# Patient Record
Sex: MALE | Race: WHITE
[De-identification: names, ages, dates, MRNs, and addresses within clinical notes are randomized per-mention and may not be internally consistent; named-entity substitution may affect disease eponyms.]

---

## 2020-08-05 ENCOUNTER — HOSPITAL ENCOUNTER (INPATIENT)
Dept: HOSPITAL 72 - EMR | Age: 55
LOS: 3 days | Discharge: NURSING, REHAB OR LONG-TERM CARE | DRG: 71 | End: 2020-08-08
Payer: MEDICARE

## 2020-08-05 VITALS
DIASTOLIC BLOOD PRESSURE: 90 MMHG | BODY MASS INDEX: 37.93 KG/M2 | SYSTOLIC BLOOD PRESSURE: 132 MMHG | HEIGHT: 72 IN | WEIGHT: 280 LBS

## 2020-08-05 VITALS — DIASTOLIC BLOOD PRESSURE: 73 MMHG | SYSTOLIC BLOOD PRESSURE: 127 MMHG

## 2020-08-05 DIAGNOSIS — Z88.0: ICD-10-CM

## 2020-08-05 DIAGNOSIS — I50.9: ICD-10-CM

## 2020-08-05 DIAGNOSIS — F31.9: ICD-10-CM

## 2020-08-05 DIAGNOSIS — Z79.84: ICD-10-CM

## 2020-08-05 DIAGNOSIS — G93.40: Primary | ICD-10-CM

## 2020-08-05 DIAGNOSIS — E78.5: ICD-10-CM

## 2020-08-05 DIAGNOSIS — J98.11: ICD-10-CM

## 2020-08-05 DIAGNOSIS — E87.6: ICD-10-CM

## 2020-08-05 DIAGNOSIS — Z53.20: ICD-10-CM

## 2020-08-05 DIAGNOSIS — F20.9: ICD-10-CM

## 2020-08-05 DIAGNOSIS — Z88.8: ICD-10-CM

## 2020-08-05 DIAGNOSIS — N39.0: ICD-10-CM

## 2020-08-05 DIAGNOSIS — E11.9: ICD-10-CM

## 2020-08-05 DIAGNOSIS — Z91.14: ICD-10-CM

## 2020-08-05 DIAGNOSIS — E03.9: ICD-10-CM

## 2020-08-05 LAB
ADD MANUAL DIFF: NO
ALBUMIN SERPL-MCNC: 4.2 G/DL (ref 3.4–5)
ALBUMIN/GLOB SERPL: 1.4 {RATIO} (ref 1–2.7)
ALP SERPL-CCNC: 49 U/L (ref 46–116)
ALT SERPL-CCNC: 54 U/L (ref 12–78)
ANION GAP SERPL CALC-SCNC: 7 MMOL/L (ref 5–15)
APPEARANCE UR: (no result)
APTT BLD: 32 SEC (ref 23–33)
APTT PPP: YELLOW S
AST SERPL-CCNC: 28 U/L (ref 15–37)
BASOPHILS NFR BLD AUTO: 2.7 % (ref 0–2)
BILIRUB SERPL-MCNC: 0.3 MG/DL (ref 0.2–1)
BUN SERPL-MCNC: 30 MG/DL (ref 7–18)
CALCIUM SERPL-MCNC: 9.4 MG/DL (ref 8.5–10.1)
CHLORIDE SERPL-SCNC: 105 MMOL/L (ref 98–107)
CO2 SERPL-SCNC: 27 MMOL/L (ref 21–32)
CREAT SERPL-MCNC: 1 MG/DL (ref 0.55–1.3)
EOSINOPHIL NFR BLD AUTO: 2.7 % (ref 0–3)
ERYTHROCYTE [DISTWIDTH] IN BLOOD BY AUTOMATED COUNT: 12.6 % (ref 11.6–14.8)
GLOBULIN SER-MCNC: 3.1 G/DL
GLUCOSE UR STRIP-MCNC: NEGATIVE MG/DL
HCT VFR BLD CALC: 43 % (ref 42–52)
HGB BLD-MCNC: 14.2 G/DL (ref 14.2–18)
INR PPP: 1.2 (ref 0.9–1.1)
KETONES UR QL STRIP: (no result)
LEUKOCYTE ESTERASE UR QL STRIP: NEGATIVE
LYMPHOCYTES NFR BLD AUTO: 40 % (ref 20–45)
MCV RBC AUTO: 94 FL (ref 80–99)
MONOCYTES NFR BLD AUTO: 10.1 % (ref 1–10)
NEUTROPHILS NFR BLD AUTO: 44.6 % (ref 45–75)
NITRITE UR QL STRIP: NEGATIVE
PH UR STRIP: 5 [PH] (ref 4.5–8)
PLATELET # BLD: 164 K/UL (ref 150–450)
POTASSIUM SERPL-SCNC: 4.4 MMOL/L (ref 3.5–5.1)
PROT UR QL STRIP: (no result)
RBC # BLD AUTO: 4.59 M/UL (ref 4.7–6.1)
SODIUM SERPL-SCNC: 139 MMOL/L (ref 136–145)
SP GR UR STRIP: 1.03 (ref 1–1.03)
UROBILINOGEN UR-MCNC: 1 MG/DL (ref 0–1)
WBC # BLD AUTO: 7.2 K/UL (ref 4.8–10.8)

## 2020-08-05 PROCEDURE — 96372 THER/PROPH/DIAG INJ SC/IM: CPT

## 2020-08-05 PROCEDURE — 83036 HEMOGLOBIN GLYCOSYLATED A1C: CPT

## 2020-08-05 PROCEDURE — 80164 ASSAY DIPROPYLACETIC ACD TOT: CPT

## 2020-08-05 PROCEDURE — 82962 GLUCOSE BLOOD TEST: CPT

## 2020-08-05 PROCEDURE — 85025 COMPLETE CBC W/AUTO DIFF WBC: CPT

## 2020-08-05 PROCEDURE — 70450 CT HEAD/BRAIN W/O DYE: CPT

## 2020-08-05 PROCEDURE — 85730 THROMBOPLASTIN TIME PARTIAL: CPT

## 2020-08-05 PROCEDURE — 80307 DRUG TEST PRSMV CHEM ANLYZR: CPT

## 2020-08-05 PROCEDURE — 71045 X-RAY EXAM CHEST 1 VIEW: CPT

## 2020-08-05 PROCEDURE — 99285 EMERGENCY DEPT VISIT HI MDM: CPT

## 2020-08-05 PROCEDURE — 80053 COMPREHEN METABOLIC PANEL: CPT

## 2020-08-05 PROCEDURE — 84484 ASSAY OF TROPONIN QUANT: CPT

## 2020-08-05 PROCEDURE — 85610 PROTHROMBIN TIME: CPT

## 2020-08-05 PROCEDURE — 93005 ELECTROCARDIOGRAM TRACING: CPT

## 2020-08-05 PROCEDURE — 84443 ASSAY THYROID STIM HORMONE: CPT

## 2020-08-05 PROCEDURE — 81003 URINALYSIS AUTO W/O SCOPE: CPT

## 2020-08-05 PROCEDURE — 36415 COLL VENOUS BLD VENIPUNCTURE: CPT

## 2020-08-05 PROCEDURE — 80061 LIPID PANEL: CPT

## 2020-08-05 NOTE — DIAGNOSTIC IMAGING REPORT
EXAM:

  CT Head Without Intravenous Contrast

 

CLINICAL HISTORY:

  ALOC

 

TECHNIQUE:

  Axial computed tomography images of the head/brain without intravenous 

contrast.  CTDI is 53 mGy and DLP is 992 mGy-cm.  One or more of the 

following dose reduction techniques were used: automated exposure control,

 adjustment of the mA and/or kV according to patient size, use of 

iterative reconstruction technique.

 

COMPARISON:

  No relevant prior studies available.

 

FINDINGS:

  Brain:  No hemorrhage, extra-axial fluid collection, mass effect, or 

edema.

  Ventricles:  Unremarkable.

  Bones/joints: Status post suboccipital craniotomy. No fracture.

  Soft tissues:  Unremarkable.

  Sinuses:  Unremarkable as visualized.

  Mastoid air cells:  Unremarkable as visualized.

 

IMPRESSION:     

  1. No acute intracranial abnormality.

## 2020-08-05 NOTE — EMERGENCY ROOM REPORT
History of Present Illness


General


Chief Complaint:  Behavioral Complaint


Source:  Patient


 (Marianne Colunga)





Present Illness


HPI


54-year-old male with history of diabetes, bipolar sent from psychiatric 

facility by Dr. Garcia here due to confusion, noncompliance with patient.  

Patient has been refusing to take all of his medication.  Denies any denies 

chest pain, shortness of breath, headache.  Poor historian.  Stable vital 

signs.  Patient was compliant and cooperative after Benadryl was administered.  

Patient in no distress at this time.


 (Marianne Colunga)


Allergies:  


Coded Allergies:  


     BUPROPION (Verified  Allergy, Unknown, 8/5/20)


Uncoded Allergies:  


     PENECILLIN (Allergy, Unknown, 8/5/20)





COVID-19 Screening


Contact w/high risk pt:  No


Experienced COVID-19 symptoms?:  No


COVID-19 Testing performed PTA:  No


 (Marianne Colunga)





Patient History


Past Medical History:  see triage record


Past Surgical History:  none


Pertinent Family History:  none


Immunizations:  UTD


Reviewed Nursing Documentation:  PMH: Agreed; PSxH: Agreed (Marianne Colunga)





Nursing Documentation-PMH


Past Medical History:  No History, Except For


Hx Diabetes:  Yes


History Of Psychiatric Problem:  Yes - schizoaffective, bipolar


 (Marianne Colunga)





Review of Systems


All Other Systems:  negative except mentioned in HPI


 (Marianne Colunga)





Physical Exam





Vital Signs








  Date Time  Temp Pulse Resp B/P (MAP) Pulse Ox O2 Delivery O2 Flow Rate FiO2


 


8/5/20 17:17 97.7 76 18 132/90 (104) 98 Room Air  








Sp02 EP Interpretation:  reviewed, normal


General Appearance:  no apparent distress, alert, GCS 15, non-toxic


Head:  normocephalic, atraumatic


ENT:  hearing grossly normal, normal pharynx, no angioedema, normal voice


Neck:  full range of motion, supple/symm/no masses


Respiratory:  chest non-tender, lungs clear, normal breath sounds, no rhonchi, 

speaking full sentences


Cardiovascular #1:  regular rate, rhythm, no edema, no murmur


Gastrointestinal:  normal bowel sounds, non tender, soft, non-distended, no 

guarding, no rebound


Genitourinary:  no CVA tenderness


Musculoskeletal:  back normal


Neurologic:  alert, motor strength/tone normal, oriented x3, sensory intact, 

responsive, speech normal


Psychiatric:  no suicidal/homicidal ideation


Skin:  no rash


Lymphatic:  no adenopathy


 (Marianne Colunga)





Medical Decision Making


PA Attestation


All diagnoses and treatment plans were reviewed and discussed with my 

supervising physician Dr. Quintana


 (Marianne Colunga)


PA Attestation


I participated in the care of this patient along with MINO Levi





Briefly, this is a 54-year-old male with history of bipolar disorder and 

schizophrenia currently taking Zyprexa presenting for evaluation of acute 

confusion, refusal to take his medications and mild agitation.  Patient does 

not have any acute complaints at this time.  No injury reported.  Sent in for 

medical evaluation by his psychiatrist, Dr. Garcia.  Labs and CT scan of the 

head have returned within normal limits.  Urinalysis shows microscopic 

hematuria but no evidence of acute urinary tract infection.  The patient's 

agitation improved after receiving Benadryl.  He will be admitted to Dr. Day as he is the assigned hospitalist per the patient's health insurance 

plan.  Patient is in stable condition.


 (Jason Quintana MD)


Diagnostic Impression:  


 Primary Impression:  


 Encephalopathy


 Additional Impression:  


 Noncompliance


ER Course


54-year-old male with history of diabetes, bipolar sent from psychiatric 

facility by Dr. Garcia here due to confusion, noncompliance with patient.  

Patient has been refusing to take all of his medication.  Denies any denies 

chest pain, shortness of breath, headache.  Poor historian.  Stable vital 

signs.  Patient was compliant and cooperative after Benadryl was administered.  

Patient in no distress at this time.





Ddx considered but are not limited to: Encephalopathy, altered level of 

consciousness, abdominal status,











Vital signs: are WNL, pt. is afebrile








 H&PE are most consistent with: Encephalopathy, noncompliance with medication














ORDERS: CBC, CMP, UA, tox head CT no contrast, chest x-ray, EKG, troponin


ER intervention: Benadryl IM








Patient was admitted with diagnosis of encephalopathy and noncompliance with 

medication      to Dr. Day      under supervision of : Mariano








pt stable at time of admission 


 (Marianne Colunga)





Laboratory Tests








Test


  8/5/20


18:11 8/5/20


20:02


 


White Blood Count


  7.2 K/UL


(4.8-10.8) 


 


 


Red Blood Count


  4.59 M/UL


(4.70-6.10)  L 


 


 


Hemoglobin


  14.2 G/DL


(14.2-18.0) 


 


 


Hematocrit


  43.0 %


(42.0-52.0) 


 


 


Mean Corpuscular Volume 94 FL (80-99)   


 


Mean Corpuscular Hemoglobin


  30.8 PG


(27.0-31.0) 


 


 


Mean Corpuscular Hemoglobin


Concent 32.9 G/DL


(32.0-36.0) 


 


 


Red Cell Distribution Width


  12.6 %


(11.6-14.8) 


 


 


Platelet Count


  164 K/UL


(150-450) 


 


 


Mean Platelet Volume


  9.1 FL


(6.5-10.1) 


 


 


Neutrophils (%) (Auto)


  44.6 %


(45.0-75.0)  L 


 


 


Lymphocytes (%) (Auto)


  40.0 %


(20.0-45.0) 


 


 


Monocytes (%) (Auto)


  10.1 %


(1.0-10.0)  H 


 


 


Eosinophils (%) (Auto)


  2.7 %


(0.0-3.0) 


 


 


Basophils (%) (Auto)


  2.7 %


(0.0-2.0)  H 


 


 


Prothrombin Time


  13.5 SEC


(9.30-11.50)  H 


 


 


Prothrombin Time INR


  1.2 (0.9-1.1)


H 


 


 


Activated Partial


Thromboplast Time 32 SEC (23-33)


  


 


 


Sodium Level


  139 MMOL/L


(136-145) 


 


 


Potassium Level


  4.4 MMOL/L


(3.5-5.1) 


 


 


Chloride Level


  105 MMOL/L


() 


 


 


Carbon Dioxide Level


  27 MMOL/L


(21-32) 


 


 


Anion Gap


  7 mmol/L


(5-15) 


 


 


Blood Urea Nitrogen


  30 mg/dL


(7-18)  H 


 


 


Creatinine


  1.0 MG/DL


(0.55-1.30) 


 


 


Estimated Glomerular


Filtration Rate > 60 mL/min


(>60) 


 


 


Glucose Level


  124 MG/DL


()  H 


 


 


Calcium Level


  9.4 MG/DL


(8.5-10.1) 


 


 


Total Bilirubin


  0.3 MG/DL


(0.2-1.0) 


 


 


Aspartate Amino Transferase


(AST) 28 U/L (15-37)


  


 


 


Alanine Aminotransferase (ALT)


  54 U/L (12-78)


  


 


 


Alkaline Phosphatase


  49 U/L


() 


 


 


Troponin I


  0.000 ng/mL


(0.000-0.056) 


 


 


Total Protein


  7.3 G/DL


(6.4-8.2) 


 


 


Albumin


  4.2 G/DL


(3.4-5.0) 


 


 


Globulin 3.1 g/dL   


 


Albumin/Globulin Ratio 1.4 (1.0-2.7)   


 


Urine Color  Yellow  


 


Urine Appearance


  


  Slightly


cloudy


 


Urine pH  5 (4.5-8.0)  


 


Urine Specific Gravity


  


  1.030


(1.005-1.035)


 


Urine Protein


  


  2+ (NEGATIVE)


H


 


Urine Glucose (UA)


  


  Negative


(NEGATIVE)


 


Urine Ketones


  


  1+ (NEGATIVE)


H


 


Urine Blood


  


  1+ (NEGATIVE)


H


 


Urine Nitrite


  


  Negative


(NEGATIVE)


 


Urine Bilirubin


  


  Negative


(NEGATIVE)


 


Urine Urobilinogen


  


  1 MG/DL


(0.0-1.0)  H


 


Urine Leukocyte Esterase


  


  Negative


(NEGATIVE)


 


Urine RBC


  


  5-10 /HPF (0 -


0)  H


 


Urine WBC


  


  0-2 /HPF (0 -


0)


 


Urine Squamous Epithelial


Cells 


  Occasional


/LPF


 


Urine Bacteria


  


  Few /HPF


(NONE)


 


Urine Mucus


  


  Many /LPF


(NONE/OCC)  H


 


Urine Opiates Screen


  


  Negative


(NEGATIVE)


 


Urine Barbiturates Screen


  


  Negative


(NEGATIVE)


 


Phencyclidine (PCP) Screen


  


  Negative


(NEGATIVE)


 


Urine Amphetamines Screen


  


  Negative


(NEGATIVE)


 


Urine Benzodiazepines Screen


  


  Negative


(NEGATIVE)


 


Urine Cocaine Screen


  


  Negative


(NEGATIVE)


 


Urine Marijuana (THC) Screen


  


  Negative


(NEGATIVE)








 (Jason Quintana MD)


EKG Diagnostic Results


Rate:  normal


Rhythm:  NSR


ST Segments:  no acute changes


Other Impression


No acute ST changes


 (Marianne Colunga)





Chest X-Ray Diagnostic Results


Chest X-Ray Diagnostic Results :  


   Chest X-Ray Ordered:  Yes


   # of Views/Limited/Complete:  1 View


   Indication:  Other


   EP Interpretation:  Yes


   PA Xray:  Interpretation reviewed, by supervising MD, and agrees with 

findings.


   Interpretation:  no consolidation, no effusion, no pneumothorax


   Impression:  No acute disease


   Electronically Signed by:  Marianne Baker PA-C


 (Marianne Colunga)





CT/MRI/US Diagnostic Results


CT/MRI/US Diagnostic Results :  


   Imaging Test Ordered:  CT head no contrast


   Impression


OMPARISON:


No relevant prior studies available.





FINDINGS:


Brain: No hemorrhage, extra-axial fluid collection, mass effect, or edema.


Ventricles: Unremarkable.


Bones/joints: Status post suboccipital craniotomy. No fracture.


Soft tissues: Unremarkable.


Sinuses: Unremarkable as visualized.


Mastoid air cells: Unremarkable as visualized.





IMPRESSION:


1. No acute intracranial abnormality.


 (Marianne Colunga)





Last Vital Signs








  Date Time  Temp Pulse Resp B/P (MAP) Pulse Ox O2 Delivery O2 Flow Rate FiO2


 


8/5/20 17:42 97.7 76 18 132/90 98 Room Air  








 (Marianne Colunga)


Disposition:  ADMITTED AS INPATIENT


Condition:  Stable


Referrals:  


Tg Day MD (PCP)











Marianne Colunga Aug 5, 2020 19:13


Jason Quintana MD Aug 5, 2020 19:54

## 2020-08-06 VITALS — DIASTOLIC BLOOD PRESSURE: 79 MMHG | SYSTOLIC BLOOD PRESSURE: 122 MMHG

## 2020-08-06 VITALS — SYSTOLIC BLOOD PRESSURE: 122 MMHG | DIASTOLIC BLOOD PRESSURE: 68 MMHG

## 2020-08-06 VITALS — DIASTOLIC BLOOD PRESSURE: 85 MMHG | SYSTOLIC BLOOD PRESSURE: 132 MMHG

## 2020-08-06 VITALS — SYSTOLIC BLOOD PRESSURE: 143 MMHG | DIASTOLIC BLOOD PRESSURE: 88 MMHG

## 2020-08-06 VITALS — SYSTOLIC BLOOD PRESSURE: 128 MMHG | DIASTOLIC BLOOD PRESSURE: 72 MMHG

## 2020-08-06 LAB
ADD MANUAL DIFF: NO
ALBUMIN SERPL-MCNC: 4.1 G/DL (ref 3.4–5)
ALBUMIN/GLOB SERPL: 1.2 {RATIO} (ref 1–2.7)
ALP SERPL-CCNC: 52 U/L (ref 46–116)
ALT SERPL-CCNC: 57 U/L (ref 12–78)
ANION GAP SERPL CALC-SCNC: 11 MMOL/L (ref 5–15)
AST SERPL-CCNC: 31 U/L (ref 15–37)
BASOPHILS NFR BLD AUTO: 1.8 % (ref 0–2)
BILIRUB SERPL-MCNC: 0.8 MG/DL (ref 0.2–1)
BUN SERPL-MCNC: 28 MG/DL (ref 7–18)
CALCIUM SERPL-MCNC: 9.4 MG/DL (ref 8.5–10.1)
CHLORIDE SERPL-SCNC: 102 MMOL/L (ref 98–107)
CHOLEST SERPL-MCNC: 158 MG/DL (ref ?–200)
CO2 SERPL-SCNC: 26 MMOL/L (ref 21–32)
CREAT SERPL-MCNC: 1 MG/DL (ref 0.55–1.3)
EOSINOPHIL NFR BLD AUTO: 2.7 % (ref 0–3)
ERYTHROCYTE [DISTWIDTH] IN BLOOD BY AUTOMATED COUNT: 12.1 % (ref 11.6–14.8)
GLOBULIN SER-MCNC: 3.5 G/DL
HCT VFR BLD CALC: 44.7 % (ref 42–52)
HDLC SERPL-MCNC: 25 MG/DL (ref 40–60)
HGB BLD-MCNC: 14.6 G/DL (ref 14.2–18)
LYMPHOCYTES NFR BLD AUTO: 35.2 % (ref 20–45)
MCV RBC AUTO: 93 FL (ref 80–99)
MONOCYTES NFR BLD AUTO: 9.7 % (ref 1–10)
NEUTROPHILS NFR BLD AUTO: 50.6 % (ref 45–75)
PLATELET # BLD: 169 K/UL (ref 150–450)
POTASSIUM SERPL-SCNC: 3.4 MMOL/L (ref 3.5–5.1)
RBC # BLD AUTO: 4.8 M/UL (ref 4.7–6.1)
SODIUM SERPL-SCNC: 139 MMOL/L (ref 136–145)
TRIGL SERPL-MCNC: 176 MG/DL (ref 30–150)
WBC # BLD AUTO: 7.5 K/UL (ref 4.8–10.8)

## 2020-08-06 RX ADMIN — INSULIN ASPART SCH UNITS: 100 INJECTION, SOLUTION INTRAVENOUS; SUBCUTANEOUS at 16:30

## 2020-08-06 RX ADMIN — DIVALPROEX SODIUM SCH MG: 500 TABLET, FILM COATED, EXTENDED RELEASE ORAL at 09:00

## 2020-08-06 RX ADMIN — INSULIN ASPART SCH UNITS: 100 INJECTION, SOLUTION INTRAVENOUS; SUBCUTANEOUS at 20:33

## 2020-08-06 RX ADMIN — Medication SCH EA: at 09:00

## 2020-08-06 RX ADMIN — METFORMIN HYDROCHLORIDE SCH MG: 500 TABLET ORAL at 09:00

## 2020-08-06 RX ADMIN — DOCUSATE SODIUM SCH MG: 100 CAPSULE, LIQUID FILLED ORAL at 09:00

## 2020-08-06 RX ADMIN — LEVOTHYROXINE SODIUM SCH MCG: 25 TABLET ORAL at 05:46

## 2020-08-06 RX ADMIN — METFORMIN HYDROCHLORIDE SCH MG: 500 TABLET ORAL at 18:34

## 2020-08-06 RX ADMIN — INSULIN ASPART SCH UNITS: 100 INJECTION, SOLUTION INTRAVENOUS; SUBCUTANEOUS at 06:30

## 2020-08-06 RX ADMIN — LEVOFLOXACIN SCH MG: 500 TABLET, FILM COATED ORAL at 16:00

## 2020-08-06 RX ADMIN — INSULIN ASPART SCH UNITS: 100 INJECTION, SOLUTION INTRAVENOUS; SUBCUTANEOUS at 11:30

## 2020-08-06 RX ADMIN — DIVALPROEX SODIUM SCH MG: 500 TABLET, FILM COATED, EXTENDED RELEASE ORAL at 18:33

## 2020-08-06 NOTE — PSYCH CONSULT PROGRESS NOTE
Psychiatry Progress Note


Psychiatry Progress Note


Subjective


the pt is disorganized and reusing meds. 


pacing around the unit


Medications





Current Medications








 Medications


  (Trade)  Dose


 Ordered  Sig/[place]


 Route


 PRN Reason  Start Time


 Stop Time Status Last Admin


Dose Admin


 


 Acetaminophen


  (Tylenol)  650 mg  Q6H  PRN


 ORAL


 Pain Scale (3-5)  8/5/20 22:45


 9/4/20 22:44   


 


 


 Acetaminophen/


 Hydrocodone Bitart


  (Norco 5/325)  1 tab  Q6H  PRN


 ORAL


 Severe Pain (Pain Scale 7-10)  8/5/20 22:45


 8/12/20 22:44   


 


 


 Alendronate Sodium


  (Fosamax)  70 mg  ONCE A  WEEK


 ORAL


   8/7/20 06:00


 9/6/20 05:59   


 


 


 Dextrose


  (Dextrose 50%)  25 ml  Q30M  PRN


 IV


 Hypoglycemia  8/5/20 22:45


 11/3/20 22:44   


 


 


 Dextrose


  (Dextrose 50%)  50 ml  Q30M  PRN


 IV


 Hypoglycemia  8/5/20 22:45


 11/3/20 22:44   


 


 


 Diphenhydramine


 HCl


  (Benadryl)  25 mg  Q6H  PRN


 ORAL


 Itching  8/5/20 22:45


 9/4/20 22:44  8/6/20 22:55


 


 


 Divalproex Sodium


  (Depakote ER)  1,000 mg  BID


 ORAL


   8/6/20 09:00


 9/5/20 08:59  8/6/20 18:33


 


 


 Docusate Sodium


  (Colace)  100 mg  DAILY


 ORAL


   8/6/20 09:00


 9/5/20 08:59   


 


 


 Gabapentin


  (Neurontin)  800 mg  FOUR TIMES A  DAY


 ORAL


   8/6/20 09:00


 9/5/20 08:59  8/6/20 18:35


 


 


 Haloperidol


 Lactate


  (Haldol)  5 mg  Q6H  PRN


 IM


 Agitation  8/6/20 00:00


 9/20/20 00:00   


 


 


 Insulin Aspart


  (NovoLOG)    BEFORE MEALS AND  HS


 SUBQ


   8/6/20 06:30


 11/4/20 06:29   


 


 


 Levofloxacin


  (Levaquin)  500 mg  DAILY


 ORAL


   8/6/20 16:00


 8/13/20 08:59   


 


 


 Levothyroxine


 Sodium


  (Synthroid)  25 mcg  ACBREAKFAST


 ORAL


   8/6/20 06:30


 9/5/20 06:29  8/6/20 05:46


 


 


 Meloxicam


  (Mobic)  7.5 mg  DAILY


 ORAL


   8/6/20 09:00


 9/5/20 08:59   


 


 


 Metformin HCl


  (Glucophage)  500 mg  TWICE A  DAY


 ORAL


   8/6/20 09:00


 9/5/20 08:59  8/6/20 18:34


 


 


 Multivitamins


 Therapeutic


  (Therapeutic


 Multivitamin)  1 ea  DAILY


 ORAL


   8/6/20 09:00


 9/5/20 08:59   


 


 


 Pantoprazole


  (Protonix)  40 mg  DAILY


 ORAL


   8/6/20 09:00


 9/5/20 08:59   


 


 


 Risperidone


  (RisperDAL)  2 mg  BEDTIME


 ORAL


   8/5/20 23:00


 9/19/20 22:59  8/6/20 21:30


 








Neurological/Psychiatric:  Reports: anxiety, depressed, emotional problems


Allergies:  


Coded Allergies:  


     BUPROPION (Verified  Allergy, Unknown, 8/5/20)


     PENICILLINS (Verified  Allergy, Unknown, 8/6/20)





Objective Data


Height (Feet):  6


Height (Inches):  0.00


Weight (Pounds):  280


General Appearance:  alert, confused, agitated


Additional Comments:


alert and disoriented.  Mood is


agitated.  Affect is flat.  Thought process is concrete.  Thought content,


no suicidal or homicidal ideation.  Cognition is impaired.  Insight and


judgment is impaired.





Assessment/Plan


Status:  unchanged


Assessment/Plan:


ASSESSMENT:


Axis I  Schizophrenia.


Acute encephalopathy.


Axis II  Deferred.


Axis III  As above.


Axis IV  Low.


Axis V  20.





PLAN:


1. Risperdal 2 mg at bedtime.


2. Haldol as needed.


3. haldol dec.


4.  Depakote 1000 b.i.d.


5. Continue to follow and readjust the medications.











Carroll Garcia MD Aug 6, 2020 23:08

## 2020-08-06 NOTE — DIAGNOSTIC IMAGING REPORT
Indication: Shortness of breath

 

Technique: XRAY Chest 1v

 

Comparison:None

 

Findings: Heart is normal in size. There is a linear density over the right heart

border. Remainder the lungs are clear. No pleural fluid. Osseous structures are

unremarkable.

 

Impression: Linear density over the right heart border. This may resent atelectasis

or a density from rib cartilage.

 

Otherwise negative.

## 2020-08-06 NOTE — HISTORY & PHYSICAL
History and Physical


History & Physicial


seen and examined.Full Dictation completed











Tg Day MD Aug 6, 2020 11:22

## 2020-08-06 NOTE — GENERAL PROGRESS NOTE
Assessment/Plan


Assessment/Plan:


Full Dictation in progress





1- Acute encephalthy


2- UTI


3- Lung atelectasis/infiltrate


4- Psych





Plan: 


start empirical abx


Psych-Pulmonary notified





Subjective


Allergies:  


Coded Allergies:  


     BUPROPION (Verified  Allergy, Unknown, 8/5/20)


Uncoded Allergies:  


     PENECILLIN (Allergy, Unknown, 8/5/20)





Objective





Last 24 Hour Vital Signs








  Date Time  Temp Pulse Resp B/P (MAP) Pulse Ox O2 Delivery O2 Flow Rate FiO2


 


8/6/20 09:00      Room Air  


 


8/6/20 08:00 97.3 76 19 122/79 (93) 96   


 


8/6/20 04:00 97.8 74 19 128/72 (90) 98   


 


8/6/20 00:17      Room Air  


 


8/6/20 00:00 97.6 78 19 122/68 (86) 99   


 


8/5/20 23:53      Room Air  


 


8/5/20 21:00 97.3 68 20 127/73 (91) 97   


 


8/5/20 20:50 97.9 85 20 130/89 98 Room Air  


 


8/5/20 17:42 97.7 76 18 132/90 98 Room Air  


 


8/5/20 17:42  76 18   Room Air  


 


8/5/20 17:17 97.7 76 18 132/90 (104) 98 Room Air  

















Intake and Output  


 


 8/5/20 8/6/20





 19:00 07:00


 


Intake Total  480 ml


 


Balance  480 ml


 


  


 


Intake Oral  480 ml


 


# Voids  1








Laboratory Tests


8/5/20 18:11: 


White Blood Count 7.2, Red Blood Count 4.59L, Hemoglobin 14.2, Hematocrit 43.0, 

Mean Corpuscular Volume 94, Mean Corpuscular Hemoglobin 30.8, Mean Corpuscular 

Hemoglobin Concent 32.9, Red Cell Distribution Width 12.6, Platelet Count 164, 

Mean Platelet Volume 9.1, Neutrophils (%) (Auto) 44.6L, Lymphocytes (%) (Auto) 

40.0, Monocytes (%) (Auto) 10.1H, Eosinophils (%) (Auto) 2.7, Basophils (%) (

Auto) 2.7H, Prothrombin Time 13.5H, Prothromb Time International Ratio 1.2H, 

Activated Partial Thromboplast Time 32, Sodium Level 139, Potassium Level 4.4, 

Chloride Level 105, Carbon Dioxide Level 27, Anion Gap 7, Blood Urea Nitrogen 

30H, Creatinine 1.0, Estimat Glomerular Filtration Rate > 60, Glucose Level 124H

, Calcium Level 9.4, Total Bilirubin 0.3, Aspartate Amino Transf (AST/SGOT) 28, 

Alanine Aminotransferase (ALT/SGPT) 54, Alkaline Phosphatase 49, Troponin I 

0.000, Total Protein 7.3, Albumin 4.2, Globulin 3.1, Albumin/Globulin Ratio 1.4


8/5/20 20:02: 


Urine Color Yellow, Urine Appearance Slightly cloudy, Urine pH 5, Urine 

Specific Gravity 1.030, Urine Protein 2+H, Urine Glucose (UA) Negative, Urine 

Ketones 1+H, Urine Blood 1+H, Urine Nitrite Negative, Urine Bilirubin Negative, 

Urine Urobilinogen 1H, Urine Leukocyte Esterase Negative, Urine RBC 5-10H, 

Urine WBC 0-2, Urine Squamous Epithelial Cells Occasional, Urine Bacteria Few, 

Urine Mucus ManyH, Urine Opiates Screen Negative, Urine Barbiturates Screen 

Negative, Phencyclidine (PCP) Screen Negative, Urine Amphetamines Screen 

Negative, Urine Benzodiazepines Screen Negative, Urine Cocaine Screen Negative, 

Urine Marijuana (THC) Screen Negative


8/6/20 05:38: 


White Blood Count 7.5, Red Blood Count 4.80, Hemoglobin 14.6, Hematocrit 44.7, 

Mean Corpuscular Volume 93, Mean Corpuscular Hemoglobin 30.5, Mean Corpuscular 

Hemoglobin Concent 32.7, Red Cell Distribution Width 12.1, Platelet Count 169, 

Mean Platelet Volume 8.8, Neutrophils (%) (Auto) 50.6, Lymphocytes (%) (Auto) 

35.2, Monocytes (%) (Auto) 9.7, Eosinophils (%) (Auto) 2.7, Basophils (%) (Auto

) 1.8, Sodium Level 139, Potassium Level 3.4L, Chloride Level 102, Carbon 

Dioxide Level 26, Anion Gap 11, Blood Urea Nitrogen 28H, Creatinine 1.0, 

Estimat Glomerular Filtration Rate > 60, Glucose Level 92, Calcium Level 9.4, 

Total Bilirubin 0.8, Aspartate Amino Transf (AST/SGOT) 31, Alanine 

Aminotransferase (ALT/SGPT) 57, Alkaline Phosphatase 52, Total Protein 7.6, 

Albumin 4.1, Globulin 3.5, Albumin/Globulin Ratio 1.2, Hemoglobin A1c 6.6H, 

Triglycerides Level 176H, Cholesterol Level 158, LDL Cholesterol 104H, HDL 

Cholesterol 25L, Cholesterol/HDL Ratio 6.3H


Height (Feet):  6


Height (Inches):  0.00


Weight (Pounds):  280











Tg Day MD Aug 6, 2020 11:24

## 2020-08-06 NOTE — CONSULTATION
DATE OF CONSULTATION:  08/06/2020

PULMONARY CONSULTATION



CONSULTING PHYSICIAN:  Titi Horne MD.



HISTORY OF PRESENT ILLNESS:  This is a 54-year-old male who is a resident

at a local psychiatric facility.  The patient was brought to the hospital

little incoherent and had bizarre thought process, admitted to the

hospital for management and care.  I have been asked to consult with

respect to abnormal x-ray findings.  Currently, the patient is unable to

provide a clear history.  His x-ray chest shows linear densities of the

right heart border, which represents atelectasis or possibly old healed

fractures.



PAST MEDICAL HISTORY:  Limited to congestive heart failure and history of

diabetes mellitus.



REVIEW OF SYSTEMS:  Unreliable.



HOME MEDICATIONS:  Reviewed and reconciled in the chart.



ALLERGIES:  To penicillin and bupropion.



PHYSICAL EXAMINATION:

GENERAL:  Reveals a 54-year-old male.

VITAL SIGNS:  Blood pressure is 140/80, heart rate 84, respiration 16.  He

is afebrile.  O2 saturation is _____ on room air.

HEENT:  Unremarkable.

LUNGS:  Clear breath sounds bilaterally.

ABDOMEN:  Soft.

EXTREMITIES:  There is no edema.

NEUROLOGIC:  Nonfocal.



LABORATORY DATA:  Lab testing is unremarkable.  Normal CBC and BMP.



IMPRESSION:

1. Atelectasis.

2. Psych disorder.



DISCUSSION:  The patient is borderline hypokalemic, this needs to be

replaced.  Need psychiatric consultation and likely will need placement.

We will follow as needed.









  ______________________________________________

  Titi Horne M.D. DR:  CECILIO

D:  08/06/2020 15:20

T:  08/06/2020 16:51

JOB#:  [id number]

CC:

## 2020-08-06 NOTE — HISTORY AND PHYSICAL REPORT
DATE OF ADMISSION:  08/05/2020

HISTORY OF PRESENT ILLNESS:  The patient is a 54-year-old male with

psychiatric problem, who was transferred regarding the change in the

mental status and showing aggression and bizarre behavior in the facility.

Reportedly, the patient had stepped out of the facility with no good

cognition.  The patient had to return to the facility after the [employer] had

been called and assisted to examine the patient.  At the time of my

evaluation, the patient is delirious, noncoherent speech, bizarre thought

process.  Otherwise, no reported fever, chills, diarrhea, or fall has been

reported.



REVIEW OF SYSTEMS:  All 12 elements of review of systems reviewed,

pertinent positives and negatives as above.



ALLERGIES:  Wellbutrin and penicillin.



FAMILY HISTORY:  Noncontributory.



PAST MEDICAL AND SURGICAL HISTORY:  Including, but not limited to CHF and

diabetes.



MEDICATIONS:  Current hospital medications including, but not limited to

levothyroxine, risperidone, metformin.



SOCIAL HISTORY:  The patient lives in a skilled nursing facility.

Remainder of information cannot be obtained, as the patient is not

cooperative and change of mental status.



PHYSICAL EXAMINATION:

VITAL SIGNS:  Blood pressure 130/90, temperature 98.2, pulse oximetry 98%

on room air, pulse rate 85, respiratory rate 18.

HEAD AND NECK:  Atraumatic and normocephalic.

CHEST:  Clear to auscultation.

HEART:  S1 and S2.  Regular rate and rhythm.

ABDOMEN:  Soft.  No organomegaly.

MUSCULOSKELETAL:  No gross motor lateralized deficit.  Positive for

spontaneous movements.

NEUROLOGIC:  The patient is awake.  Positive for disorganized behavior and

disorganized speech, episodes of delirium as noted.



LABORATORY DATA:  Dated 08/05/2020 shows WBC 7.2, hemoglobin of 14.2.

Sodium 139, creatinine 1.  Serum toxicology negative.  Urine is positive

for bacteria, 1+ blood.



IMAGING:  Head CT dated 08/05/2020 unremarkable for acute

pathology.



Chest x-ray is positive for linear density in the lungs.



ASSESSMENT:

1. Acute encephalopathy.

2. UTI.

3. Pulmonary infiltrate/atelectasis.

4. Diabetes type 2.

5. Hyperlipidemia.

6. Hypercoagulation state, unknown cause.

7. Hypothyroidism.

8. GI and DVT prophylaxes.



PLAN OF CARE:  We will start the patient on empiric antibiotic treatment.

Consult psychiatrist, Pulmonary.  Limited workup as the patient's refusal

of treatment.



COMMENT:  The time of this dictation does not reflect the actual time of

encounter.









  ______________________________________________

  Tg Day M.D.





DR:  MELINDA

D:  08/06/2020 11:20

T:  08/06/2020 12:48

JOB#:  [id number]

CC:

## 2020-08-07 VITALS — DIASTOLIC BLOOD PRESSURE: 93 MMHG | SYSTOLIC BLOOD PRESSURE: 146 MMHG

## 2020-08-07 VITALS — SYSTOLIC BLOOD PRESSURE: 149 MMHG | DIASTOLIC BLOOD PRESSURE: 87 MMHG

## 2020-08-07 RX ADMIN — DIVALPROEX SODIUM SCH MG: 500 TABLET, FILM COATED, EXTENDED RELEASE ORAL at 08:57

## 2020-08-07 RX ADMIN — LEVOFLOXACIN SCH MG: 500 TABLET, FILM COATED ORAL at 08:57

## 2020-08-07 RX ADMIN — DIVALPROEX SODIUM SCH MG: 500 TABLET, FILM COATED, EXTENDED RELEASE ORAL at 21:26

## 2020-08-07 RX ADMIN — LEVOTHYROXINE SODIUM SCH MCG: 25 TABLET ORAL at 05:58

## 2020-08-07 RX ADMIN — INSULIN ASPART SCH UNITS: 100 INJECTION, SOLUTION INTRAVENOUS; SUBCUTANEOUS at 05:59

## 2020-08-07 RX ADMIN — INSULIN ASPART SCH UNITS: 100 INJECTION, SOLUTION INTRAVENOUS; SUBCUTANEOUS at 11:30

## 2020-08-07 RX ADMIN — INSULIN ASPART SCH UNITS: 100 INJECTION, SOLUTION INTRAVENOUS; SUBCUTANEOUS at 20:58

## 2020-08-07 RX ADMIN — DOCUSATE SODIUM SCH MG: 100 CAPSULE, LIQUID FILLED ORAL at 08:57

## 2020-08-07 RX ADMIN — METFORMIN HYDROCHLORIDE SCH MG: 500 TABLET ORAL at 17:19

## 2020-08-07 RX ADMIN — METFORMIN HYDROCHLORIDE SCH MG: 500 TABLET ORAL at 08:57

## 2020-08-07 RX ADMIN — Medication SCH EA: at 08:57

## 2020-08-07 RX ADMIN — INSULIN ASPART SCH UNITS: 100 INJECTION, SOLUTION INTRAVENOUS; SUBCUTANEOUS at 16:30

## 2020-08-07 NOTE — PULMONOLOGY PROGRESS NOTE
Subjective


Interval Events:  None new


Constitutional:  Reports: no symptoms


HEENT:  Repors: no symptoms


Respiratory:  Reports: no symptoms


Cardiovascular:  Reports: no symptoms


Gastrointestinal/Abdominal:  Reports: no symptoms


Allergies:  


Coded Allergies:  


     BUPROPION (Verified  Allergy, Unknown, 8/5/20)


     PENICILLINS (Verified  Allergy, Unknown, 8/6/20)





Objective





Last 24 Hour Vital Signs








  Date Time  Temp Pulse Resp B/P (MAP) Pulse Ox O2 Delivery O2 Flow Rate FiO2


 


8/6/20 21:00      Room Air  


 


8/6/20 16:00 97.5 79 20 132/85 (101) 97   


 


8/6/20 12:00 98.4 83 19 143/88 (106) 97   


 


8/6/20 09:00      Room Air  


 


8/6/20 08:00 97.3 76 19 122/79 (93) 96   

















Intake and Output  


 


 8/6/20 8/7/20





 19:00 07:00


 


Intake Total 840 ml 300 ml


 


Balance 840 ml 300 ml


 


  


 


Intake Oral 840 ml 300 ml


 


# Voids 4 3


 


# Bowel Movements 1 








General Appearance:  no acute distress


HEENT:  normocephalic


Respiratory:  chest wall non-tender, lungs clear


Cardiovascular:  normal peripheral pulses, normal rate


Abdomen:  normal bowel sounds





Current Medications








 Medications


  (Trade)  Dose


 Ordered  Sig/[place]


 Route


 PRN Reason  Start Time


 Stop Time Status Last Admin


Dose Admin


 


 Acetaminophen


  (Tylenol)  650 mg  Q6H  PRN


 ORAL


 Pain Scale (3-5)  8/5/20 22:45


 9/4/20 22:44   


 


 


 Acetaminophen/


 Hydrocodone Bitart


  (Norco 5/325)  1 tab  Q6H  PRN


 ORAL


 Severe Pain (Pain Scale 7-10)  8/5/20 22:45


 8/12/20 22:44   


 


 


 Alendronate Sodium


  (Fosamax)  70 mg  ONCE A  WEEK


 ORAL


   8/7/20 06:00


 9/6/20 05:59   


 


 


 Dextrose


  (Dextrose 50%)  25 ml  Q30M  PRN


 IV


 Hypoglycemia  8/5/20 22:45


 11/3/20 22:44   


 


 


 Dextrose


  (Dextrose 50%)  50 ml  Q30M  PRN


 IV


 Hypoglycemia  8/5/20 22:45


 11/3/20 22:44   


 


 


 Diphenhydramine


 HCl


  (Benadryl)  25 mg  Q6H  PRN


 ORAL


 Itching  8/5/20 22:45


 9/4/20 22:44  8/6/20 22:55


 


 


 Divalproex Sodium


  (Depakote ER)  1,000 mg  BID


 ORAL


   8/6/20 09:00


 9/5/20 08:59  8/6/20 18:33


 


 


 Docusate Sodium


  (Colace)  100 mg  DAILY


 ORAL


   8/6/20 09:00


 9/5/20 08:59   


 


 


 Gabapentin


  (Neurontin)  800 mg  FOUR TIMES A  DAY


 ORAL


   8/6/20 09:00


 9/5/20 08:59  8/6/20 18:35


 


 


 Haloperidol


 Lactate


  (Haldol)  5 mg  Q6H  PRN


 IM


 Agitation  8/6/20 00:00


 9/20/20 00:00   


 


 


 Insulin Aspart


  (NovoLOG)    BEFORE MEALS AND  HS


 SUBQ


   8/6/20 06:30


 11/4/20 06:29   


 


 


 Levofloxacin


  (Levaquin)  500 mg  DAILY


 ORAL


   8/6/20 16:00


 8/13/20 08:59   


 


 


 Levothyroxine


 Sodium


  (Synthroid)  25 mcg  ACBREAKFAST


 ORAL


   8/6/20 06:30


 9/5/20 06:29  8/6/20 05:46


 


 


 Meloxicam


  (Mobic)  7.5 mg  DAILY


 ORAL


   8/6/20 09:00


 9/5/20 08:59   


 


 


 Metformin HCl


  (Glucophage)  500 mg  TWICE A  DAY


 ORAL


   8/6/20 09:00


 9/5/20 08:59  8/6/20 18:34


 


 


 Multivitamins


 Therapeutic


  (Therapeutic


 Multivitamin)  1 ea  DAILY


 ORAL


   8/6/20 09:00


 9/5/20 08:59   


 


 


 Pantoprazole


  (Protonix)  40 mg  DAILY


 ORAL


   8/6/20 09:00


 9/5/20 08:59   


 


 


 Risperidone


  (RisperDAL)  2 mg  BEDTIME


 ORAL


   8/5/20 23:00


 9/19/20 22:59  8/6/20 21:30


 











Assessment/Plan


Assessment/Plan


IMPRESSION:


1. Atelectasis.


2. Psych disorder.





DISCUSSION:  





Saturating well on RA


I will follow as needed














  ______________________________________________


  JENA Almonte Omar Syed MD Aug 7, 2020 07:48

## 2020-08-07 NOTE — PSYCH CONSULT PROGRESS NOTE
Psychiatry Progress Note


Psychiatry Progress Note


Subjective


the pt is disorganized and reusing meds. 


pacing around the unit


the pt gets combative


responds to internal stimuli


Medications





Current Medications








 Medications


  (Trade)  Dose


 Ordered  Sig/[place]


 Route


 PRN Reason  Start Time


 Stop Time Status Last Admin


Dose Admin


 


 Acetaminophen


  (Tylenol)  650 mg  Q6H  PRN


 ORAL


 Pain Scale (3-5)  8/5/20 22:45


 9/4/20 22:44   


 


 


 Acetaminophen/


 Hydrocodone Bitart


  (Norco 5/325)  1 tab  Q6H  PRN


 ORAL


 Severe Pain (Pain Scale 7-10)  8/5/20 22:45


 8/12/20 22:44   


 


 


 Alendronate Sodium


  (Fosamax)  70 mg  ONCE A  WEEK


 ORAL


   8/7/20 06:00


 9/6/20 05:59   


 


 


 Dextrose


  (Dextrose 50%)  25 ml  Q30M  PRN


 IV


 Hypoglycemia  8/5/20 22:45


 11/3/20 22:44   


 


 


 Dextrose


  (Dextrose 50%)  50 ml  Q30M  PRN


 IV


 Hypoglycemia  8/5/20 22:45


 11/3/20 22:44   


 


 


 Diphenhydramine


 HCl


  (Benadryl)  25 mg  Q6H  PRN


 ORAL


 Itching  8/5/20 22:45


 9/4/20 22:44  8/6/20 22:55


 


 


 Divalproex Sodium


  (Depakote ER)  1,000 mg  Q12HR


 ORAL


   8/7/20 21:00


 9/5/20 08:59  8/7/20 21:26


 


 


 Docusate Sodium


  (Colace)  100 mg  DAILY


 ORAL


   8/6/20 09:00


 9/5/20 08:59  8/7/20 08:57


 


 


 Gabapentin


  (Neurontin)  800 mg  FOUR TIMES A  DAY


 ORAL


   8/7/20 13:00


 9/5/20 12:59  8/7/20 21:26


 


 


 Haloperidol


 Lactate


  (Haldol)  5 mg  Q6H  PRN


 IM


 Agitation  8/6/20 00:00


 9/20/20 00:00  8/7/20 12:16


 


 


 Insulin Aspart


  (NovoLOG)    BEFORE MEALS AND  HS


 SUBQ


   8/6/20 06:30


 11/4/20 06:29   


 


 


 Levofloxacin


  (Levaquin)  500 mg  DAILY


 ORAL


   8/6/20 16:00


 8/13/20 08:59  8/7/20 08:57


 


 


 Levothyroxine


 Sodium


  (Synthroid)  25 mcg  ACBREAKFAST


 ORAL


   8/6/20 06:30


 9/5/20 06:29  8/6/20 05:46


 


 


 Meloxicam


  (Mobic)  7.5 mg  DAILY


 ORAL


   8/6/20 09:00


 9/5/20 08:59  8/7/20 08:57


 


 


 Metformin HCl


  (Glucophage)  500 mg  TWICE A  DAY


 ORAL


   8/6/20 09:00


 9/5/20 08:59  8/7/20 17:19


 


 


 Multivitamins


 Therapeutic


  (Therapeutic


 Multivitamin)  1 ea  DAILY


 ORAL


   8/6/20 09:00


 9/5/20 08:59  8/7/20 08:57


 


 


 Pantoprazole


  (Protonix)  40 mg  DAILY


 ORAL


   8/6/20 09:00


 9/5/20 08:59  8/7/20 08:57


 


 


 Risperidone


  (RisperDAL)  2 mg  BEDTIME


 ORAL


   8/5/20 23:00


 9/19/20 22:59  8/7/20 21:26


 








Neurological/Psychiatric:  Reports: anxiety, depressed, emotional problems


Allergies:  


Coded Allergies:  


     BUPROPION (Verified  Allergy, Unknown, 8/5/20)


     PENICILLINS (Verified  Allergy, Unknown, 8/6/20)





Objective Data


Height (Feet):  6


Height (Inches):  0.00


Weight (Pounds):  280


General Appearance:  no apparent distress, alert, confused, agitated


Behavior Mannerisms:  poor eye contact


Mental Status Exam - Affect:  blunted


Mental Status Exam - Mood:  anxious, agitated


Mental Status Exam - Thought P:  disorganized





Assessment/Plan


Axis I:


ASSESSMENT:


Axis I  Schizophrenia.


Acute encephalopathy.


Axis II  Deferred.


Axis III  As above.


Axis IV  Low.


Axis V  20.





PLAN:


1. Risperdal 2 mg at bedtime.


2. Haldol as needed.


3. haldol dec.


4.  Depakote 1000 b.i.d.


5. Continue to follow and readjust the medications.


Status:  unchanged


Assessment/Plan:


ASSESSMENT:


Axis I  Schizophrenia.


Acute encephalopathy.


Axis II  Deferred.


Axis III  As above.


Axis IV  Low.


Axis V  20.





PLAN:


1. Risperdal 2 mg at bedtime.


2. Haldol as needed.


3. haldol dec.


4.  Depakote 1000 b.i.d.


5. Continue to follow and readjust the medications.











Carroll Garcia MD Aug 7, 2020 23:22

## 2020-08-07 NOTE — CONSULTATION
DATE OF CONSULTATION:  08/06/2020

HISTORY OF PRESENT ILLNESS:  This is a 54-year-old male with history of

CHF, diabetes, schizophrenia who has been admitted to the hospital due to

altered mental status.  The patient is bizarre and aggressive, _____

returned back to the facility by [employer]. During the evaluation, the patient

has waxing and waning consciousness, disorganized, easily agitated,

responding to internal stimuli, endorsing auditory hallucinations.



PAST PSYCHIATRIC HISTORY:  Schizophrenia, several psychiatric

hospitalization, has been treated with antipsychotic.



PAST MEDICAL HISTORY:  CHF, diabetes mellitus.



ALLERGIES:  Penicillin and bupropion.



SUBSTANCE ABUSE HISTORY:  No known history of illicit drug use or

alcohol.



MENTAL STATUS EXAMINATION:  The patient is alert and disoriented.  Mood is

agitated.  Affect is flat.  Thought process is concrete.  Thought content,

no suicidal or homicidal ideation.  Cognition is impaired.  Insight and

judgment is impaired.



ASSESSMENT:

Axis I  Schizophrenia.

Acute encephalopathy.

Axis II  Deferred.

Axis III  As above.

Axis IV  Low.

Axis V  20.



PLAN:

1. Risperdal 2 mg at bedtime.

2. Haldol as needed.

3. Discontinue lithium.

4. Discontinue Zyprexa.

5. Depakote 1000 b.i.d.

6. Continue to follow and readjust the medications.









  ______________________________________________

  Carroll Garcia M.D.





DR:  Eyad

D:  08/06/2020 23:14

T:  08/06/2020 23:32

JOB#:  [id number]

CC:

## 2020-08-07 NOTE — GENERAL PROGRESS NOTE
Assessment/Plan


Assessment/Plan:


S, O: detached from reality, awake, incoherent thought process and 

communication 








HEAD AND NECK:  Atraumatic and normocephalic.


CHEST:  Clear to auscultation.HEART:  S1 and S2.  Regular rate and rhythm.


ABDOMEN:  Soft.  No organomegaly.MUSCULOSKELETAL:  No gross motor lateralized 

deficit.  Positive for


spontaneous movements.NEUROLOGIC:  The patient is awake.  Positive for 

disorganized behavior and


disorganized speech, episodes of delirium as noted.





LABORATORY DATA:  Dated 08/06/2020 reviewed





Chest x-ray is positive for linear density in the lungs.





ASSESSMENT:


1. Acute encephalopathy.


2. UTI.


3. Pulmonary infiltrate/atelectasis.


4. Diabetes type 2.


5. Hyperlipidemia.


6. Hypercoagulation state, unknown cause.


7. Hypothyroidism.


8. GI and DVT prophylaxes.


9. Refusal of care





PLAN OF CARE: limited medical evaluation. secondary to refusing IV treatment 

and lab draw. Will switch medication to PO. Current psych management





Subjective


Allergies:  


Coded Allergies:  


     BUPROPION (Verified  Allergy, Unknown, 8/5/20)


     PENICILLINS (Verified  Allergy, Unknown, 8/6/20)





Objective





Last 24 Hour Vital Signs








  Date Time  Temp Pulse Resp B/P (MAP) Pulse Ox O2 Delivery O2 Flow Rate FiO2


 


8/7/20 08:00 98.6 89 20 146/93 (110) 98   


 


8/6/20 21:00      Room Air  


 


8/6/20 16:00 97.5 79 20 132/85 (101) 97   


 


8/6/20 12:00 98.4 83 19 143/88 (106) 97   

















Intake and Output  


 


 8/6/20 8/7/20





 19:00 07:00


 


Intake Total 840 ml 300 ml


 


Balance 840 ml 300 ml


 


  


 


Intake Oral 840 ml 300 ml


 


# Voids 4 3


 


# Bowel Movements 1 








Height (Feet):  6


Height (Inches):  0.00


Weight (Pounds):  280











Tg Day MD Aug 7, 2020 09:16

## 2020-08-08 VITALS — SYSTOLIC BLOOD PRESSURE: 133 MMHG | DIASTOLIC BLOOD PRESSURE: 81 MMHG

## 2020-08-08 VITALS — SYSTOLIC BLOOD PRESSURE: 148 MMHG | DIASTOLIC BLOOD PRESSURE: 98 MMHG

## 2020-08-08 VITALS — SYSTOLIC BLOOD PRESSURE: 129 MMHG | DIASTOLIC BLOOD PRESSURE: 88 MMHG

## 2020-08-08 RX ADMIN — METFORMIN HYDROCHLORIDE SCH MG: 500 TABLET ORAL at 08:45

## 2020-08-08 RX ADMIN — INSULIN ASPART SCH UNITS: 100 INJECTION, SOLUTION INTRAVENOUS; SUBCUTANEOUS at 11:30

## 2020-08-08 RX ADMIN — DIVALPROEX SODIUM SCH MG: 500 TABLET, FILM COATED, EXTENDED RELEASE ORAL at 08:54

## 2020-08-08 RX ADMIN — Medication SCH EA: at 08:45

## 2020-08-08 RX ADMIN — LEVOTHYROXINE SODIUM SCH MCG: 25 TABLET ORAL at 05:56

## 2020-08-08 RX ADMIN — INSULIN ASPART SCH UNITS: 100 INJECTION, SOLUTION INTRAVENOUS; SUBCUTANEOUS at 06:12

## 2020-08-08 RX ADMIN — DOCUSATE SODIUM SCH MG: 100 CAPSULE, LIQUID FILLED ORAL at 08:45

## 2020-08-08 RX ADMIN — LEVOFLOXACIN SCH MG: 500 TABLET, FILM COATED ORAL at 08:57

## 2020-08-08 NOTE — PULMONOLOGY PROGRESS NOTE
Subjective


Interval Events:  None new


Constitutional:  Reports: no symptoms


HEENT:  Repors: no symptoms


Respiratory:  Reports: no symptoms


Cardiovascular:  Reports: no symptoms


Gastrointestinal/Abdominal:  Reports: no symptoms


Allergies:  


Coded Allergies:  


     BUPROPION (Verified  Allergy, Unknown, 8/5/20)


     PENICILLINS (Verified  Allergy, Unknown, 8/6/20)





Objective





Last 24 Hour Vital Signs








  Date Time  Temp Pulse Resp B/P (MAP) Pulse Ox O2 Delivery O2 Flow Rate FiO2


 


8/8/20 09:00      Room Air  


 


8/8/20 08:00 98.2 79 20 133/81 (98) 96   


 


8/8/20 04:00 97.8 82 18 129/88 (102) 97   


 


8/7/20 22:00 98.9  20 149/87 (107) 99   


 


8/7/20 20:49      Room Air  

















Intake and Output  


 


 8/7/20 8/8/20





 19:00 07:00


 


Intake Total 360 ml 480 ml


 


Balance 360 ml 480 ml


 


  


 


Intake Oral 360 ml 480 ml


 


# Voids 3 








General Appearance:  no acute distress


HEENT:  normocephalic


Respiratory:  chest wall non-tender, lungs clear


Cardiovascular:  normal peripheral pulses, normal rate


Abdomen:  normal bowel sounds





Current Medications








 Medications


  (Trade)  Dose


 Ordered  Sig/[place]


 Route


 PRN Reason  Start Time


 Stop Time Status Last Admin


Dose Admin


 


 Acetaminophen


  (Tylenol)  650 mg  Q6H  PRN


 ORAL


 Pain Scale (3-5)  8/5/20 22:45


 9/4/20 22:44   


 


 


 Acetaminophen/


 Hydrocodone Bitart


  (Norco 5/325)  1 tab  Q6H  PRN


 ORAL


 Severe Pain (Pain Scale 7-10)  8/5/20 22:45


 8/12/20 22:44   


 


 


 Alendronate Sodium


  (Fosamax)  70 mg  ONCE A  WEEK


 ORAL


   8/7/20 06:00


 9/6/20 05:59   


 


 


 Dextrose


  (Dextrose 50%)  25 ml  Q30M  PRN


 IV


 Hypoglycemia  8/5/20 22:45


 11/3/20 22:44   


 


 


 Dextrose


  (Dextrose 50%)  50 ml  Q30M  PRN


 IV


 Hypoglycemia  8/5/20 22:45


 11/3/20 22:44   


 


 


 Diphenhydramine


 HCl


  (Benadryl)  25 mg  Q6H  PRN


 ORAL


 Itching  8/5/20 22:45


 9/4/20 22:44  8/6/20 22:55


 


 


 Divalproex Sodium


  (Depakote ER)  1,000 mg  Q12HR


 ORAL


   8/7/20 21:00


 9/5/20 08:59  8/8/20 08:54


 


 


 Docusate Sodium


  (Colace)  100 mg  DAILY


 ORAL


   8/6/20 09:00


 9/5/20 08:59  8/8/20 08:45


 


 


 Gabapentin


  (Neurontin)  800 mg  FOUR TIMES A  DAY


 ORAL


   8/7/20 13:00


 9/5/20 12:59  8/8/20 08:45


 


 


 Haloperidol


 Lactate


  (Haldol)  5 mg  Q6H  PRN


 IM


 Agitation  8/6/20 00:00


 9/20/20 00:00  8/7/20 12:16


 


 


 Insulin Aspart


  (NovoLOG)    BEFORE MEALS AND  HS


 SUBQ


   8/6/20 06:30


 11/4/20 06:29   


 


 


 Levofloxacin


  (Levaquin)  500 mg  DAILY


 ORAL


   8/6/20 16:00


 8/13/20 08:59  8/8/20 08:57


 


 


 Levothyroxine


 Sodium


  (Synthroid)  25 mcg  ACBREAKFAST


 ORAL


   8/6/20 06:30


 9/5/20 06:29  8/8/20 05:56


 


 


 Meloxicam


  (Mobic)  7.5 mg  DAILY


 ORAL


   8/6/20 09:00


 9/5/20 08:59  8/8/20 08:45


 


 


 Metformin HCl


  (Glucophage)  500 mg  TWICE A  DAY


 ORAL


   8/6/20 09:00


 9/5/20 08:59  8/8/20 08:45


 


 


 Multivitamins


 Therapeutic


  (Therapeutic


 Multivitamin)  1 ea  DAILY


 ORAL


   8/6/20 09:00


 9/5/20 08:59  8/8/20 08:45


 


 


 Pantoprazole


  (Protonix)  40 mg  DAILY


 ORAL


   8/6/20 09:00


 9/5/20 08:59  8/8/20 08:45


 


 


 Risperidone


  (RisperDAL)  2 mg  BEDTIME


 ORAL


   8/5/20 23:00


 9/19/20 22:59  8/7/20 21:26


 











Assessment/Plan


Assessment/Plan


IMPRESSION:


1. Atelectasis.


2. Psych disorder.





DISCUSSION:  





Saturating well on RA


I will follow as needed














  ______________________________________________


  JENA Almonte Omar Syed MD Aug 8, 2020 11:23

## 2020-08-08 NOTE — GENERAL PROGRESS NOTE
Assessment/Plan


Status:  unchanged


Assessment/Plan:


S, O: detached from reality, awake, incoherent thought process and 

communication 








HEAD AND NECK:  Atraumatic and normocephalic.


CHEST:  Clear to auscultation.HEART:  S1 and S2.  Regular rate and rhythm.


ABDOMEN:  Soft.  No organomegaly.MUSCULOSKELETAL:  No gross motor lateralized 

deficit.  Positive for


spontaneous movements.NEUROLOGIC:  The patient is awake.  Positive for 

disorganized behavior and


disorganized speech, episodes of delirium as noted.





LABORATORY DATA:  Dated 08/06/2020 reviewed





Chest x-ray is positive for linear density in the lungs.





ASSESSMENT:


1. Acute encephalopathy.


2. UTI.


3. Pulmonary infiltrate/atelectasis.


4. Diabetes type 2.


5. Hyperlipidemia.


6. Hypercoagulation state, unknown cause.


7. Hypothyroidism.


8. GI and DVT prophylaxes.


9. Refusal of care





PLAN OF CARE: limited medical evaluation. secondary to refusing IV treatment 

and lab draw. Will switch medication to PO. Current psych management. No 

additional intervention from medical perspective





Subjective


Allergies:  


Coded Allergies:  


     BUPROPION (Verified  Allergy, Unknown, 8/5/20)


     PENICILLINS (Verified  Allergy, Unknown, 8/6/20)





Objective





Last 24 Hour Vital Signs








  Date Time  Temp Pulse Resp B/P (MAP) Pulse Ox O2 Delivery O2 Flow Rate FiO2


 


8/8/20 04:00 97.8 82 18 129/88 (102) 97   


 


8/7/20 22:00 98.9  20 149/87 (107) 99   


 


8/7/20 20:49      Room Air  


 


8/7/20 09:25      Room Air  

















Intake and Output  


 


 8/7/20 8/8/20





 19:00 07:00


 


Intake Total 360 ml 480 ml


 


Balance 360 ml 480 ml


 


  


 


Intake Oral 360 ml 480 ml


 


# Voids 3 








Laboratory Tests


8/7/20 08:48: Valproic Acid (Depakene) Level < 3L


Height (Feet):  6


Height (Inches):  0.00


Weight (Pounds):  280











Tg Day MD Aug 8, 2020 08:09

## 2020-08-10 NOTE — DISCHARGE SUMMARY
Discharge Summary


Discharge Summary


_


DATE OF ADMISSION: 8/5/2020





DATE OF DISCHARGE: 8/8/2020








DISCHARGED BY: Dr. Day





REASON FOR ADMISSION: 


54 years old male, with past medical history of diabetes mellitus, bipolar 

disorder, was sent from psychiatric facility by psychiatrist due to confusion 

and noncompliance with medication treatment.  


Patient apparently was refusing all of his medications.  


He denied chest pain, shortness of breath or headache.  


Patient overall was a poor historian.  


Upon evaluation vital signs were stable.  


Laboratory work-up revealed no leukocytosis, stable hemoglobin  and hematocrit.


Stable electrolytes .


Glucose 124 .


BUN 30, creatinine 1.0.  


Troponin was negative.  


Urinalysis revealed +1 ketone,  +2 protein , no evidence of urinary tract 

infection.  


Urine toxicology screen was negative.  


CT of the head revealed no acute intracranial pathology.


Chest x-ray demonstrated a linear density over the right heart border,  

possibly representing atelectasis or density from rib cartilage.  


Patient subsequently admitted to medical surgical floor for further management. 


 


CONSULTANTS:


pulmonary Dr. Horne


psychiatrist 


 


[facility] COURSE: 


Patient admitted to medical surgical floor.  


Pulmonologist and psychiatrist consulted.


Psychiatric medication regimen  was optimized as per psychiatrist. 


Reality orientation supportive therapy provided.


Patient was on empiric antibiotic for possible UTI.  


DVT and GI prophylaxis provided.  


Pulse oximetry remained stable on room air. 


Blood sugar was managed with  metformin.  Sliding scale of insulin was on board 

as needed.  Hemoglobin A1c 6.6 at goal. 


Levothyroxine continued.  TSH within normal limits. 


Lipid panel stable.   


Potassium was replaced.  


Patient was refusing IV treatment and lab draws.  Medications  changed to oral.

  


Current psychiatric management was continued.  


No further additional intervention from medical perspective.  


Patient slowly stabilized and was ready for transfer back to skilled nursing 

facility for continuation of care


 








FINAL DIAGNOSES: 


Acute encephalopathy


Atelectasis 


Pulmonary infiltrate/atelectasis


Diabetes mellitus type 2


Hyperlipidemia


Hypothyroidism


Refusal of care


Schizophrenia


 





DISCHARGE MEDICATIONS:


See Medication Reconciliation list.





DISCHARGE INSTRUCTIONS:


Patient was discharged to the skilled nursing facility. 


Follow up with medical doctor at the facility.





I have been assigned to dictate discharge summary for this account. 


I was not involved in the patient's management.











Paige Patten NP Aug 10, 2020 13:23